# Patient Record
Sex: MALE | Race: WHITE | Employment: OTHER | URBAN - METROPOLITAN AREA
[De-identification: names, ages, dates, MRNs, and addresses within clinical notes are randomized per-mention and may not be internally consistent; named-entity substitution may affect disease eponyms.]

---

## 2022-06-03 ENCOUNTER — LAB ENCOUNTER (OUTPATIENT)
Dept: LAB | Age: 77
End: 2022-06-03
Attending: INTERNAL MEDICINE
Payer: MEDICARE

## 2022-06-03 DIAGNOSIS — Z01.818 PRE-OP TESTING: ICD-10-CM

## 2022-06-04 LAB — SARS-COV-2 RNA RESP QL NAA+PROBE: NOT DETECTED

## 2022-06-06 PROBLEM — Z86.010 HISTORY OF ADENOMATOUS POLYP OF COLON: Status: ACTIVE | Noted: 2022-06-06

## 2022-06-06 PROBLEM — D12.4 BENIGN NEOPLASM OF DESCENDING COLON: Status: ACTIVE | Noted: 2022-06-06

## 2022-06-06 PROBLEM — D12.3 BENIGN NEOPLASM OF TRANSVERSE COLON: Status: ACTIVE | Noted: 2022-06-06

## 2022-06-06 PROBLEM — Z86.0101 HISTORY OF ADENOMATOUS POLYP OF COLON: Status: ACTIVE | Noted: 2022-06-06

## 2022-06-06 PROBLEM — D12.5 BENIGN NEOPLASM OF SIGMOID COLON: Status: ACTIVE | Noted: 2022-06-06

## 2024-10-01 ENCOUNTER — HOSPITAL ENCOUNTER (OUTPATIENT)
Dept: GENERAL RADIOLOGY | Age: 79
Discharge: HOME | End: 2024-10-01
Attending: FAMILY MEDICINE
Payer: MEDICARE

## 2024-10-01 DIAGNOSIS — R06.2 NOCTURNAL COUGH WITH WHEEZE: ICD-10-CM

## 2024-10-01 DIAGNOSIS — R05.3 CHRONIC COUGH: ICD-10-CM

## 2024-10-01 DIAGNOSIS — R05.8 NOCTURNAL COUGH WITH WHEEZE: ICD-10-CM

## 2024-10-01 PROCEDURE — 71046 X-RAY EXAM CHEST 2 VIEWS: CPT | Performed by: FAMILY MEDICINE

## 2025-04-17 ENCOUNTER — MED REC SCAN ONLY (OUTPATIENT)
Facility: LOCATION | Age: 80
End: 2025-04-17

## 2025-05-28 ENCOUNTER — OFFICE VISIT (OUTPATIENT)
Facility: LOCATION | Age: 80
End: 2025-05-28
Payer: MEDICARE

## 2025-05-28 DIAGNOSIS — H93.13 TINNITUS OF BOTH EARS: Primary | ICD-10-CM

## 2025-05-28 DIAGNOSIS — H93.293 ABNORMAL AUDITORY PERCEPTION OF BOTH EARS: Primary | ICD-10-CM

## 2025-05-28 PROCEDURE — 92557 COMPREHENSIVE HEARING TEST: CPT | Performed by: AUDIOLOGIST

## 2025-05-28 PROCEDURE — 99203 OFFICE O/P NEW LOW 30 MIN: CPT | Performed by: OTOLARYNGOLOGY

## 2025-05-28 PROCEDURE — 1160F RVW MEDS BY RX/DR IN RCRD: CPT | Performed by: OTOLARYNGOLOGY

## 2025-05-28 PROCEDURE — 92567 TYMPANOMETRY: CPT | Performed by: AUDIOLOGIST

## 2025-05-28 PROCEDURE — 1159F MED LIST DOCD IN RCRD: CPT | Performed by: OTOLARYNGOLOGY

## 2025-05-28 NOTE — PROGRESS NOTES
Hay Bautista is a 79 year old male.   Chief Complaint   Patient presents with    Ringing In Ear     HPI:   He has a long history of tinnitus.  He feels like maybe his hearing is slightly down.  He has no ear infections.  He does have a history of some noise exposure when he was young playing in a band.  Current Medications[1]   Past Medical History[2]   Social History:  Short Social Hx on File[3]   Past Surgical History[4]      REVIEW OF SYSTEMS:   GENERAL HEALTH: feels well otherwise  GENERAL : denies fever, chills, sweats, weight loss, weight gain  SKIN: denies any unusual skin lesions or rashes  RESPIRATORY: denies shortness of breath with exertion  NEURO: denies headaches    EXAM:   There were no vitals taken for this visit.    System Findings Details   Constitutional  Overall appearance - Normal.   Psychiatric  Orientation - Oriented to time, place, person & situation. Appropriate mood and affect.   Head/Face  Facial features -- Normal. Skull - Normal.   Eyes  Pupils equal ,round ,react to light and accomidate   Ears, Nose, Throat, Neck  Ears clear no fluid or infection oropharynx clear neck no masses   Neurological  Memory - Normal. Cranial nerves - Cranial nerves II through XII grossly intact.   Lymph Detail  Submental. Submandibular. Anterior cervical. Posterior cervical. Supraclavicular.     Latest Audiogram Result (Hz) Exam performed: 5/28/2025 9:28 AM Last edited by Vangie Daley Au.D on 5/28/2025 9:43 AM        125 250  1500 2000 3000 4000 6000 8000    Right air:  20 10  20  15 25 55 65 80    Left air:  20 15  15  15 25 60 55 80    Right mastoid bone:   10  15  15  55      Left mastoid bone:   10  15  15  55         Reliability:  Good    Transducer:  Headphones    Technique:  Conventional Audiometry    Comments:            Latest Speech Audiometry  Last edited by Vangie Daley Au.D on 5/28/2025 9:43 AM       Ear Method PTA SAT SRT [facility] Thumb Region Test/list Score (%) Intensity Mask/noise Notes    right  live voice   15   W-22 100 55      left live voice   15   W-22 100 55       Comments: PIPB screening was negative for rollover,  bilaterally.                   Latest Tympanogram Result       Probe Tone (Hz): 226 Exam performed: 5/28/2025 9:29 AM Last edited by Vangie Daley Au.D on 5/28/2025 9:43 AM      Tympanograms  These were drawn by a user, not generated from device data      Right Ear Left Ear                     Right Ear Left Ear    Tympanogram type: Type A Type A    Canal volume (mL): 0.9 0.76    Peak pressure (daPa):      Peak amplitude (mL):      Tympanogram width (daPa):        Comments:                    Latest Audiogram and Tympanogram Result Text  Last edited by Vangie Daley Au.D on 5/28/2025  9:43 AM      Study Result                 Narrative & Impression  History:  Pt reports symptoms of tinnitus, bilaterally.  He reports that members of his family have noticed that he is not hearing well.     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     Rec:  Follow up with MD.                    ASSESSMENT AND PLAN:   1. Tinnitus of both ears  Audiogram reviewed with patient.  This shows sensory presbycusis.  He is medically cleared for hearing aids if he so desires.  He may try some over-the-counter magnesium to see if that makes a difference in his tinnitus.      The patient indicates understanding of these issues and agrees to the plan.    No follow-ups on file.    Johann Tse MD  5/28/2025  1:39 PM       [1]   Current Outpatient Medications   Medication Sig Dispense Refill    Multiple Vitamins-Minerals (PRESERVISION AREDS 2+MULTI VIT OR) Take by mouth.      amLODIPine-Atorvastatin 5-10 MG Oral Tab Take 1 tablet by mouth daily.      PEG 3350-KCl-Na Bicarb-NaCl 420 g Oral Recon Soln Take 4,000 mL by mouth As Directed. 4000 mL 0    Losartan Potassium 100 MG Oral Tab Take 100 mg by mouth once daily.  0    Metoprolol Succinate ER 50 MG Oral Tablet 24 Hr Take 50 mg by mouth  once daily.  0    aspirin 81 MG Oral Tab Take 81 mg by mouth daily.     [2]   Past Medical History:   Abdominal hernia   [3]   Social History  Socioeconomic History    Marital status:    Tobacco Use    Smoking status: Never    Smokeless tobacco: Never   Substance and Sexual Activity    Alcohol use: Yes     Alcohol/week: 17.0 - 21.0 standard drinks of alcohol     Types: 17 - 21 Standard drinks or equivalent per week    Drug use: No     Social Drivers of Health     Food Insecurity: Low Risk  (4/11/2025)    Received from [facility]    Food Insecurity     Within the past 12 months, you worried that your food would run out before you got money to buy more.  : Never true     Within the past 12 months, the food you bought just didn't last and you didn't have money to get more. : Never true   Transportation Needs: Not At Risk (4/11/2025)    Received from [facility]    Transportation Needs     In the past 12 months, has lack of reliable transportation kept you from medical appointments, meetings, work or from getting things needed for daily living? : No   Stress: Low Risk  (4/11/2025)    Received from [facility]    Stress     Stress is when someone feels tense, nervous, anxious, or can't sleep at night because their mind is troubled. How stressed are you? : Not at all    Received from [facility]    Housing Stability   [4]   Past Surgical History:  Procedure Laterality Date    Colonoscopy      Colonoscopy      Dental surgery procedure      wisdom teeth and other teeth pulled    Hernia surgery      Inguinal hernia repair Left 9/1/2016    Procedure: HERNIA INGUINAL REPAIR ADULT;  Surgeon: Jozef Moss MD;  Location: [facility] OR

## 2025-05-28 NOTE — PROGRESS NOTES
Hay Bautista was seen for audiometric evaluation today.  Referred back to physician.     Arely Delong